# Patient Record
Sex: FEMALE | Race: WHITE | NOT HISPANIC OR LATINO | Employment: OTHER | URBAN - METROPOLITAN AREA
[De-identification: names, ages, dates, MRNs, and addresses within clinical notes are randomized per-mention and may not be internally consistent; named-entity substitution may affect disease eponyms.]

---

## 2023-08-30 DIAGNOSIS — N18.4 CKD (CHRONIC KIDNEY DISEASE) STAGE 4, GFR 15-29 ML/MIN: Primary | ICD-10-CM

## 2023-08-31 RX ORDER — CARVEDILOL 25 MG/1
25 TABLET ORAL 2 TIMES DAILY
COMMUNITY

## 2023-08-31 RX ORDER — VIT C/E/ZN/COPPR/LUTEIN/ZEAXAN 250MG-90MG
CAPSULE ORAL
COMMUNITY

## 2023-08-31 RX ORDER — ROSUVASTATIN CALCIUM 5 MG/1
1 TABLET, COATED ORAL DAILY
COMMUNITY

## 2023-08-31 RX ORDER — LETROZOLE 2.5 MG/1
TABLET, FILM COATED ORAL
COMMUNITY
End: 2023-09-05

## 2023-08-31 RX ORDER — FERROUS SULFATE 325(65) MG
TABLET, DELAYED RELEASE (ENTERIC COATED) ORAL
COMMUNITY
End: 2023-09-05

## 2023-08-31 RX ORDER — LORAZEPAM 1 MG/1
1 TABLET ORAL ONCE
COMMUNITY
Start: 2023-07-25 | End: 2023-09-05

## 2023-08-31 RX ORDER — ANASTROZOLE 1 MG/1
1 TABLET ORAL DAILY
COMMUNITY
Start: 2023-06-20

## 2023-08-31 RX ORDER — LORATADINE 10 MG/1
10 TABLET ORAL
COMMUNITY
End: 2023-09-05

## 2023-08-31 RX ORDER — PROCHLORPERAZINE MALEATE 10 MG
TABLET ORAL
COMMUNITY
End: 2023-09-05

## 2023-08-31 RX ORDER — OMEGA-3-ACID ETHYL ESTERS 1 G/1
2 CAPSULE, LIQUID FILLED ORAL 2 TIMES DAILY
COMMUNITY

## 2023-08-31 RX ORDER — ALLOPURINOL 300 MG/1
300 TABLET ORAL DAILY
COMMUNITY

## 2023-08-31 RX ORDER — FENOFIBRATE 160 MG/1
160 TABLET ORAL DAILY
COMMUNITY
Start: 2023-08-03

## 2023-08-31 RX ORDER — OFLOXACIN 3 MG/ML
5 SOLUTION AURICULAR (OTIC)
COMMUNITY
End: 2023-09-05

## 2023-08-31 RX ORDER — METFORMIN HYDROCHLORIDE 500 MG/1
TABLET ORAL
COMMUNITY
End: 2023-09-05

## 2023-08-31 RX ORDER — MOXIFLOXACIN 5 MG/ML
1 SOLUTION/ DROPS OPHTHALMIC ONCE
COMMUNITY
Start: 2023-06-20

## 2023-08-31 RX ORDER — UMECLIDINIUM BROMIDE AND VILANTEROL TRIFENATATE 62.5; 25 UG/1; UG/1
6.25 POWDER RESPIRATORY (INHALATION) DAILY
COMMUNITY
Start: 2022-03-28

## 2023-08-31 RX ORDER — VALSARTAN AND HYDROCHLOROTHIAZIDE 320; 25 MG/1; MG/1
1 TABLET, FILM COATED ORAL DAILY
COMMUNITY

## 2023-08-31 RX ORDER — ACETAMINOPHEN 500 MG
500 TABLET ORAL EVERY 6 HOURS PRN
COMMUNITY

## 2023-08-31 RX ORDER — HYDRALAZINE HYDROCHLORIDE 50 MG/1
50 TABLET, FILM COATED ORAL 3 TIMES DAILY
COMMUNITY

## 2023-08-31 RX ORDER — MONTELUKAST SODIUM 10 MG/1
1 TABLET ORAL NIGHTLY
COMMUNITY
Start: 2022-10-25

## 2023-09-01 NOTE — PROGRESS NOTES
Pt Name:  Mejia Robins  Pt :  1955  Pt MRN:  [id number]    Date: 2023    Reason for visit:   Mejia Robins is a 67 y.o. c female presenting for initial evaluation of CKD with serum creatinine 1.86, eGFR 27 and Chronic Kidney Disease Stage IV.     Chief Complaint:   The patient denies any complaints today.    HPI:  The patient is being seen today for initial evaluation of CKD and was referred by Dr. Augie Barney.  The patient reports 25 year history of hypertension and has a history of Diabetes Mellitus but has no idea how long she has had it because now she no longer takes medication for her DM.  She has no family history of kidney disease. She is s/p right lumpectomy and lymph node removal on  and required radiation therapy. The patient denies fatigue, weakness, poor appetite, metallic taste, nausea, cramping, chest pain, palpitations, orthopnea, PND, trouble concentrating, decreased urination.  She c/o SOB with exertion, edema.    Home BPs when checked are <150 - 160/80 per patient. The patient is following a low sodium diet. The patient is avoiding NSAIDs. The patient is drinking 64 oz of water daily.       History:   Past Medical History:   Diagnosis Date    Arthritis     Back pain     Breast cancer     Congestive heart failure     Diabetes mellitus     Essential (primary) hypertension     GERD (gastroesophageal reflux disease)     Gout, unspecified     Hypercalcemia     Lung disease     Mixed hyperlipidemia     Neurological disease     Obstructive sleep apnea      Past Surgical History:   Procedure Laterality Date    APPENDECTOMY      choleycystectomy      COLONOSCOPY      HYSTERECTOMY      LUMPECTOMY, BREAST  2020    SKIN TAG REMOVAL      up to 15    SURGICAL REMOVAL OF LYMPH NODE      uterine suspend       Family History   Problem Relation Age of Onset    Heart disease Mother     Dementia Mother     Diabetes Mellitus Mother     Hypertension Mother     Lung cancer Sister         sisters x  2 with cancer; 1  at 50; one with onset at age 65     Social History     Substance and Sexual Activity   Alcohol Use None     Social History     Substance and Sexual Activity   Drug Use Not on file     Social History     Substance and Sexual Activity   Sexual Activity Not on file     has no history on file for sexual activity.  Social History     Tobacco Use   Smoking Status Every Day    Types: Cigarettes, Vaping with nicotine   Smokeless Tobacco Never       Allergies:  Review of patient's allergies indicates:   Allergen Reactions    Amlodipine     Iodinated contrast media Other (See Comments)    Iohexol     Sulfa (sulfonamide antibiotics)     Latex Rash         Current Outpatient Medications:     acetaminophen (TYLENOL) 500 MG tablet, Take 500 mg by mouth every 6 (six) hours as needed., Disp: , Rfl:     allopurinoL (ZYLOPRIM) 300 MG tablet, Take 300 mg by mouth Daily., Disp: , Rfl:     anastrozole (ARIMIDEX) 1 mg Tab, Take 1 mg by mouth once daily., Disp: , Rfl:     carvediloL (COREG) 25 MG tablet, Take 25 mg by mouth 2 (two) times daily., Disp: , Rfl:     cetirizine (ZYRTEC) 10 MG tablet, Take by mouth as needed., Disp: , Rfl:     cycloSPORINE (RESTASIS) 0.05 % ophthalmic emulsion, Place 1 drop into both eyes 2 (two) times daily., Disp: , Rfl:     fenofibrate 160 MG Tab, Take 160 mg by mouth once daily., Disp: , Rfl:     hydrALAZINE (APRESOLINE) 50 MG tablet, Take 50 mg by mouth 3 (three) times daily., Disp: , Rfl:     montelukast (SINGULAIR) 10 mg tablet, Take 1 tablet by mouth every evening., Disp: , Rfl:     moxifloxacin (VIGAMOX) 0.5 % ophthalmic solution, Place 1 drop into the right eye 4 (four) times daily., Disp: , Rfl:     omega-3 acid ethyl esters (LOVAZA) 1 gram capsule, omega-3 acid ethyl esters 1 gram capsule, Disp: , Rfl:     rosuvastatin (CRESTOR) 5 MG tablet, Take 1 tablet by mouth once daily. Takes 40 mg po daily, Disp: , Rfl:     umeclidinium-vilanteroL (ANORO ELLIPTA) 62.5-25 mcg/actuation  "DsDv, Take 6.25 mcg by mouth once daily., Disp: , Rfl:     valsartan-hydrochlorothiazide (DIOVAN-HCT) 320-25 mg per tablet, Take 1 tablet by mouth once daily., Disp: , Rfl:     vit C,I-Up-amfss-lutein-zeaxan (PRESERVISION AREDS-2) 250-90-40-1 mg Cap, Bid, Disp: , Rfl:     dapagliflozin propanediol (FARXIGA) 10 mg tablet, Take 1 tablet (10 mg total) by mouth once daily., Disp: 90 tablet, Rfl: 3    ROS:  Review of Systems   Constitutional:  Negative for activity change and appetite change.   HENT:  Negative for hearing loss.    Eyes:  Negative for visual disturbance.   Respiratory:  Positive for shortness of breath. Negative for wheezing.    Cardiovascular:  Positive for leg swelling. Negative for chest pain.   Gastrointestinal:  Negative for abdominal pain, diarrhea, nausea and vomiting.   Genitourinary:  Negative for decreased urine volume, dysuria, flank pain, frequency and urgency.   Neurological:  Negative for dizziness, weakness and headaches.       Physical Exam:   Vitals:   Vitals:    09/05/23 0855   BP: (!) 121/57   Pulse: (!) 54   SpO2: 96%   Weight: 108 kg (238 lb)   Height: 5' 6" (1.676 m)     Body mass index is 38.41 kg/m².    Physical Exam  Vitals reviewed. Exam conducted with a chaperone present ().   Constitutional:       General: She is not in acute distress.     Appearance: Normal appearance. She is morbidly obese.   HENT:      Head: Normocephalic and atraumatic.      Mouth/Throat:      Mouth: Mucous membranes are moist.   Eyes:      Extraocular Movements: Extraocular movements intact.      Pupils: Pupils are equal, round, and reactive to light.   Cardiovascular:      Rate and Rhythm: Regular rhythm. Bradycardia present.      Heart sounds: Murmur heard.      Systolic murmur is present with a grade of 2/6.   Pulmonary:      Effort: Pulmonary effort is normal.      Breath sounds: No wheezing, rhonchi or rales.   Musculoskeletal:         General: No swelling.      Right lower leg: Edema present. "      Left lower le+ Edema present.   Skin:     General: Skin is warm and dry.   Neurological:      Mental Status: She is alert and oriented to person, place, and time.   Psychiatric:         Mood and Affect: Mood normal.         Behavior: Behavior normal.       Labs/Tests 23:    K 4.8    C02 24  Glu 125  BUN 61  Creat 1.86  CA 10.7  Phos 3.5  Alb 4.7  eGFR 27    PTH 68    Hgb 10.1    Vit D 22.1    Urine Prot/Creat  600 mg       Lab Results   Component Value Date    HGB 11.0 (L) 2022    IRON 90 2022    TIBC 354 2022    FERRITIN 255.5 2022         Lab Results   Component Value Date    EXTIRONSATUR 25 (H) 2022    EXTIRONSATUR 8 (L) 2022    EXTIRONSATUR 10 (L) 2022         Diagnosis:  Plan and Assessment:  1. Type 2 diabetes mellitus with stage 4 chronic kidney disease, without long-term current use of insulin  Assessment & Plan:  Controlled with HgbA1c 7.0% on 8/15/23 - Continue with diet control     Orders:  -     Renal Function Panel; Future; Expected date: 2023  -     Protein/Creatinine Ratio, Urine; Future; Expected date: 2023    2. Hypertension with impaired renal function  Assessment & Plan:  At goal, Monitor BP, 2 Gram NA diet, Continue Coreg 25 mg po BID, Hydralazine 50 mg po TID, Diovan/HCTZ 320/25 mg po daily     Orders:  -     Renal Function Panel; Future; Expected date: 2023    3. Secondary hyperparathyroidism of renal origin  Assessment & Plan:  PTH 68 - No indication for specialized Vitamin D at this time.     Orders:  -     Vitamin D; Future; Expected date: 2023  -     PTH, Intact; Future; Expected date: 2023    4. Hypercalcemia  Assessment & Plan:  Calcium 10.7 / Albumin 4.7; Followed by Hematology/Oncology     Orders:  -     Vitamin D; Future; Expected date: 2023    5. Anemia in stage 4 chronic kidney disease  Assessment & Plan:  Hgb 10.1 - No indication for AGAPITO therapy at this time.     Orders:  -      Hemoglobin; Future; Expected date: 12/05/2023  -     Ferritin; Future; Expected date: 12/05/2023  -     Iron and TIBC; Future; Expected date: 12/05/2023    6. Non-nephrotic range proteinuria  Assessment & Plan:  600 mg - ARB and will add SGLT2     Orders:  -     Protein/Creatinine Ratio, Urine; Future; Expected date: 12/05/2023    7. Vitamin D deficiency, unspecified  Assessment & Plan:  Vitamin D 22.1 - unable to treat due to hypercalcemia with CA 10.7 / albumin 4.7     Orders:  -     Vitamin D; Future; Expected date: 12/05/2023    8. Chronic diastolic congestive heart failure    9. Mixed hyperlipidemia  Assessment & Plan:  Continue - Fenofibrate 160 mg po daily, Lovaza 1 gram po daily       10. Chronic kidney disease, stage IV (severe)  Assessment & Plan:  Serum Creatinine 1.86 / eGFR 27; Avoid NSAIDS, Assure 72 oz of water daily, Meds per med list above.  On ARB and will add SGLT2 if her insurance will cover it.  Will refer to CKD Smart [facility] for further education.     Will obtain Renal US - when [facility] system is intact and allows scheduling     Have discussed futility of renal biopsy and she defers at this time.     Orders:  -     US Kidney; Future; Expected date: 10/05/2023  -     Ambulatory referral/consult to Kidney Smart; Future; Expected date: 09/12/2023  -     Hemoglobin; Future; Expected date: 12/05/2023  -     Vitamin D; Future; Expected date: 12/05/2023  -     Renal Function Panel; Future; Expected date: 12/05/2023  -     PTH, Intact; Future; Expected date: 12/05/2023  -     Ferritin; Future; Expected date: 12/05/2023  -     Iron and TIBC; Future; Expected date: 12/05/2023  -     Protein/Creatinine Ratio, Urine; Future; Expected date: 12/05/2023    Other orders  -     dapagliflozin propanediol (FARXIGA) 10 mg tablet; Take 1 tablet (10 mg total) by mouth once daily.  Dispense: 90 tablet; Refill: 3           My reconciliation of medication should not condone or support use of medications that have been  previously prescribed for patients by other providers.  I am only documenting the current medications patients is taking and may change doses, add or discontinue medications based on information provided by the patient.     The patient has been provided with their current level of kidney function including eGFR and creatinine.    We discussed the potential for common complications of CKD including anemia, electrolyte abnormalities, abnormal fluid balance, mineral bone disease and malnutrition.    We discussed strategies to slow the progression of their kidney disease including:  Avoid nephrotoxic agents. Avoid over-the-counter and prescription NSAIDs (Ibuprofren, Motrin, Naproxyn, Aleve, Mobic, Celebrex, Toradol, Advil). All of these can worsen kidney function, elevate BP, cause fluid retention/swelling and elevate potassium. Avoid iodine contrast agents and gadolinium, which can worsen kidney function and/or cause kidney failure. Avoid phosphosoda bowel preps which can worsen kidney function.  Work to improve modifiable risk factors. Aim for good control of blood glucose without episodes of hypoglycemia. Notify the provider managing your diabetes if your blood glucose < 60. Aim for good blood pressure control without episodes of hypotension. Call the office if your systolic blood pressure is consistently < 110. Aim for good control of your cholesterol.  AIC goal <7.0  BP goal <130/80        Keeping these in goal range will help prevent progression of cardiovascular disease            and chronic kidney disease.    We discussed dietary modifications:  Low sodium diet: 2 gm/d or less  Limit/avoid high potassium foods  Avoid potassium containing salt substitutes  Limit/avoid high phosphorus foods  Limit daily protein intake to 0.8-1 gm/kg of your ideal body weight.    We discussed lifestyle modifications:  Make sure you are drinking plenty of fluids--64 ounces (1/2 gallon) daily  Exercise at least 30 minutes 5 x per  week (total 150 minutes per week), example brisk walking  Achieve and maintain a healthy weight (BMI 20-25)  Limit alcohol consumption to <2 drinks per day  Stop smoking  Make sure you stay current on important vaccines-- pneumonia vaccines (Pneumovax and Prevnar), flu vaccine, Hepatitis B (especially patients nearing renal replacement therapy and planning hemodialysis) and Covid-19 vaccine.     Recommendations:  Monitor your BP at home daily and record.  Bring readings to your next appt.  Call the office if your BP is persistently >130/80.  Seek urgent medical attention with signs and symptoms of uremia - extreme weakness, fatigue, confusion, anorexia, metallic taste in mouth, hiccoughs, cramping, itching, chest pain, swelling, or trouble sleeping.    Follow Up:   Follow up in about 3 months (around 12/5/2023).

## 2023-09-01 NOTE — ASSESSMENT & PLAN NOTE
At goal, Monitor BP, 2 Gram NA diet, Continue Coreg 25 mg po BID, Hydralazine 50 mg po TID, Diovan/HCTZ 320/25 mg po daily

## 2023-09-01 NOTE — ASSESSMENT & PLAN NOTE
Serum Creatinine 1.86 / eGFR 27; Avoid NSAIDS, Assure 72 oz of water daily, Meds per med list above.  On ARB and will add SGLT2 if her insurance will cover it.  Will refer to CKD Smart [facility] for further education.     Will obtain Renal US - when [facility] system is intact and allows scheduling     Have discussed futility of renal biopsy and she defers at this time.

## 2023-09-05 ENCOUNTER — OFFICE VISIT (OUTPATIENT)
Dept: NEPHROLOGY | Facility: CLINIC | Age: 68
End: 2023-09-05
Payer: MEDICARE

## 2023-09-05 VITALS
OXYGEN SATURATION: 96 % | DIASTOLIC BLOOD PRESSURE: 57 MMHG | WEIGHT: 238 LBS | HEART RATE: 54 BPM | BODY MASS INDEX: 38.25 KG/M2 | SYSTOLIC BLOOD PRESSURE: 121 MMHG | HEIGHT: 66 IN

## 2023-09-05 DIAGNOSIS — N18.4 TYPE 2 DIABETES MELLITUS WITH STAGE 4 CHRONIC KIDNEY DISEASE, WITHOUT LONG-TERM CURRENT USE OF INSULIN: ICD-10-CM

## 2023-09-05 DIAGNOSIS — E83.52 HYPERCALCEMIA: ICD-10-CM

## 2023-09-05 DIAGNOSIS — E55.9 VITAMIN D DEFICIENCY, UNSPECIFIED: ICD-10-CM

## 2023-09-05 DIAGNOSIS — D63.1 ANEMIA IN STAGE 4 CHRONIC KIDNEY DISEASE: ICD-10-CM

## 2023-09-05 DIAGNOSIS — N25.81 SECONDARY HYPERPARATHYROIDISM OF RENAL ORIGIN: ICD-10-CM

## 2023-09-05 DIAGNOSIS — E11.22 TYPE 2 DIABETES MELLITUS WITH STAGE 4 CHRONIC KIDNEY DISEASE, WITHOUT LONG-TERM CURRENT USE OF INSULIN: ICD-10-CM

## 2023-09-05 DIAGNOSIS — N18.4 ANEMIA IN STAGE 4 CHRONIC KIDNEY DISEASE: ICD-10-CM

## 2023-09-05 DIAGNOSIS — R80.9 NON-NEPHROTIC RANGE PROTEINURIA: ICD-10-CM

## 2023-09-05 DIAGNOSIS — N18.4 CHRONIC KIDNEY DISEASE, STAGE IV (SEVERE): ICD-10-CM

## 2023-09-05 DIAGNOSIS — I12.9 HYPERTENSION WITH IMPAIRED RENAL FUNCTION: ICD-10-CM

## 2023-09-05 DIAGNOSIS — E78.2 MIXED HYPERLIPIDEMIA: ICD-10-CM

## 2023-09-05 DIAGNOSIS — I50.32 CHRONIC DIASTOLIC CONGESTIVE HEART FAILURE: ICD-10-CM

## 2023-09-05 PROCEDURE — 99204 OFFICE O/P NEW MOD 45 MIN: CPT | Mod: S$GLB,,, | Performed by: NURSE PRACTITIONER

## 2023-09-05 PROCEDURE — 99204 PR OFFICE/OUTPT VISIT, NEW, LEVL IV, 45-59 MIN: ICD-10-PCS | Mod: S$GLB,,, | Performed by: NURSE PRACTITIONER

## 2023-09-05 RX ORDER — CETIRIZINE HYDROCHLORIDE 10 MG/1
TABLET ORAL
COMMUNITY

## 2023-09-05 RX ORDER — CYCLOSPORINE 0.5 MG/ML
1 EMULSION OPHTHALMIC 2 TIMES DAILY
COMMUNITY

## 2023-09-05 RX ORDER — DAPAGLIFLOZIN 10 MG/1
10 TABLET, FILM COATED ORAL DAILY
Qty: 90 TABLET | Refills: 3 | Status: SHIPPED | OUTPATIENT
Start: 2023-09-05

## 2023-09-12 ENCOUNTER — TELEPHONE (OUTPATIENT)
Dept: NEPHROLOGY | Facility: CLINIC | Age: 68
End: 2023-09-12
Payer: MEDICARE

## 2023-09-19 ENCOUNTER — DOCUMENTATION ONLY (OUTPATIENT)
Dept: NEPHROLOGY | Facility: CLINIC | Age: 68
End: 2023-09-19
Payer: MEDICARE

## 2023-09-19 NOTE — PROGRESS NOTES
Pt called to go over bp log from 9/6-9/18/23 with bp 124//71. Pt then said her sbp have been running 121-140 since starting the farxiga. The pt did not want to change her bp med at this time, so no changes were done. She was instructed to keep a bp log of every other day and report any significant changes. Pt acknowledged understanding.

## 2023-10-02 ENCOUNTER — TELEPHONE (OUTPATIENT)
Dept: NEPHROLOGY | Facility: CLINIC | Age: 68
End: 2023-10-02
Payer: MEDICARE

## 2023-10-02 NOTE — TELEPHONE ENCOUNTER
Patient called-states she is having dry mouth, muscle cramps in back and leg, no appetite including drinking anything since starting the farxiga. Also, she says her BP is dropping to low.     9/25 117/55  9/26  121/57  9/27 115/48  9/28  127/49  9/29  109/48  9/30  101/40  10/2  106/42    Patient asked if she could cut the Farxiga in half ?   Per Viviana Nowak no changes were made when patient turned in her BP readings.  She would like to know what can she do?     Please advise

## 2023-10-03 NOTE — TELEPHONE ENCOUNTER
"I spoke to patient, She would like to know about Jardiance ? If this is something she could take instead? I tried to tell her there was nothing to put in place for Farxiga- it was a stand alone drug but she did not understand. She did not understand why she was having all these symptoms. I told her to contact her PCP and explain those symptoms to her PCP because those symptoms (back pain and back/leg cramps) would not be coming from [facility]. She said, " she drinks enough water that her dry mouth isn't because of that". All of her symptoms started when she started this medication. Pt instructed to stop medication.  She proceeds to say she felt like she was being put on the back burner that she needed to find a new Nephrology provider. I told her I would ask about Jardiance and get back to her. She was very upset.   Please advise "

## 2023-12-12 ENCOUNTER — OFFICE VISIT (OUTPATIENT)
Dept: NEPHROLOGY | Facility: CLINIC | Age: 68
End: 2023-12-12
Payer: MEDICARE

## 2023-12-12 VITALS
HEART RATE: 80 BPM | WEIGHT: 220.81 LBS | SYSTOLIC BLOOD PRESSURE: 158 MMHG | OXYGEN SATURATION: 97 % | BODY MASS INDEX: 35.49 KG/M2 | DIASTOLIC BLOOD PRESSURE: 73 MMHG | HEIGHT: 66 IN

## 2023-12-12 DIAGNOSIS — D50.8 OTHER IRON DEFICIENCY ANEMIA: ICD-10-CM

## 2023-12-12 DIAGNOSIS — E66.01 CLASS 2 SEVERE OBESITY DUE TO EXCESS CALORIES WITH SERIOUS COMORBIDITY AND BODY MASS INDEX (BMI) OF 35.0 TO 35.9 IN ADULT: ICD-10-CM

## 2023-12-12 DIAGNOSIS — E11.22 TYPE 2 DIABETES MELLITUS WITH STAGE 4 CHRONIC KIDNEY DISEASE, WITHOUT LONG-TERM CURRENT USE OF INSULIN: Primary | ICD-10-CM

## 2023-12-12 DIAGNOSIS — I12.9 HYPERTENSION WITH IMPAIRED RENAL FUNCTION: ICD-10-CM

## 2023-12-12 DIAGNOSIS — N18.4 TYPE 2 DIABETES MELLITUS WITH STAGE 4 CHRONIC KIDNEY DISEASE, WITHOUT LONG-TERM CURRENT USE OF INSULIN: Primary | ICD-10-CM

## 2023-12-12 DIAGNOSIS — E83.52 HYPERCALCEMIA: ICD-10-CM

## 2023-12-12 DIAGNOSIS — N18.4 CHRONIC KIDNEY DISEASE, STAGE IV (SEVERE): ICD-10-CM

## 2023-12-12 DIAGNOSIS — E78.2 MIXED HYPERLIPIDEMIA: ICD-10-CM

## 2023-12-12 DIAGNOSIS — E55.9 VITAMIN D DEFICIENCY, UNSPECIFIED: ICD-10-CM

## 2023-12-12 DIAGNOSIS — R80.9 NON-NEPHROTIC RANGE PROTEINURIA: ICD-10-CM

## 2023-12-12 PROBLEM — D50.9 IRON DEFICIENCY ANEMIA, UNSPECIFIED: Status: ACTIVE | Noted: 2023-12-12

## 2023-12-12 PROCEDURE — 99214 PR OFFICE/OUTPT VISIT, EST, LEVL IV, 30-39 MIN: ICD-10-PCS | Mod: S$GLB,,, | Performed by: NURSE PRACTITIONER

## 2023-12-12 PROCEDURE — 99214 OFFICE O/P EST MOD 30 MIN: CPT | Mod: S$GLB,,, | Performed by: NURSE PRACTITIONER

## 2023-12-12 NOTE — PROGRESS NOTES
Pt Name:  Mejia Robins  Pt :  1955  Pt MRN:  [id number]    Date: 2023    Reason for visit:   Mejia Robins is a 67 y.o. c female presenting for CKD follow up with serum creatinine 1.96, eGFR 27 and Chronic Kidney Disease Stage 27.     Chief Complaint:   The patient denies any complaints today.    HPI:  The patient is being seen today for follow up CKD and the status of her kidney function. Since the last visit, patient reports no health changes. She is down 18 lbs since her last OV and reports she is not taking her Coreg, Hydralazine nor her Valsartan/HCTZ routinely.  She reports her Bps are running 120/ 60 - 70.  The patient denies fatigue, weakness, poor appetite, metallic taste, nausea, cramping, chest pain, palpitations, SOB, orthopnea, PND, trouble concentrating, decreased urination.  She c/o edema.     Home BPs when checked are <130/80 per patient. she is not taking her Coreg, Hydralazine nor her Valsartan/HCTZ routinely.  She reports her Bps are running 120/ 60 - 70. The patient is following a low sodium diet, she reports cutting out all salt.  The patient is avoiding NSAIDs. The patient is drinking 72 oz of water daily.     Since last visit on 23 patient reports no changes in medical history.      History:   Past Medical History:   Diagnosis Date    Anemia in stage 4 chronic kidney disease     Arthritis     Back pain     Breast cancer     Congestive heart failure     Congestive heart failure     Diabetes mellitus     Essential (primary) hypertension     GERD (gastroesophageal reflux disease)     Gout, unspecified     Hypercalcemia     Lung disease     Mixed hyperlipidemia     Neurological disease     Obstructive sleep apnea     Secondary hyperparathyroidism of renal origin      Past Surgical History:   Procedure Laterality Date    APPENDECTOMY      choleycystectomy      COLONOSCOPY      HYSTERECTOMY      LUMPECTOMY, BREAST  2020    SKIN TAG REMOVAL      up to 15    SURGICAL REMOVAL OF  LYMPH NODE      uterine suspend       Family History   Problem Relation Age of Onset    Heart disease Mother     Dementia Mother     Diabetes Mellitus Mother     Hypertension Mother     Lung cancer Sister         sisters x 2 with cancer; 1  at 50; one with onset at age 65     Social History     Substance and Sexual Activity   Alcohol Use None     Social History     Substance and Sexual Activity   Drug Use Not on file     Social History     Substance and Sexual Activity   Sexual Activity Not on file     has no history on file for sexual activity.  Social History     Tobacco Use   Smoking Status Every Day    Types: Cigarettes, Vaping with nicotine   Smokeless Tobacco Never       Allergies:  Review of patient's allergies indicates:   Allergen Reactions    Amlodipine     Iodinated contrast media Other (See Comments)    Iohexol     Sulfa (sulfonamide antibiotics)     Latex Rash         Current Outpatient Medications:     acetaminophen (TYLENOL) 500 MG tablet, Take 500 mg by mouth every 6 (six) hours as needed., Disp: , Rfl:     allopurinoL (ZYLOPRIM) 300 MG tablet, Take 300 mg by mouth Daily., Disp: , Rfl:     anastrozole (ARIMIDEX) 1 mg Tab, Take 1 mg by mouth once daily., Disp: , Rfl:     carvediloL (COREG) 25 MG tablet, Take 25 mg by mouth 2 (two) times daily. Has not been taking routinely, Disp: , Rfl:     cetirizine (ZYRTEC) 10 MG tablet, Take by mouth as needed., Disp: , Rfl:     cycloSPORINE (RESTASIS) 0.05 % ophthalmic emulsion, Place 1 drop into both eyes 2 (two) times daily., Disp: , Rfl:     dapagliflozin propanediol (FARXIGA) 10 mg tablet, Take 1 tablet (10 mg total) by mouth once daily., Disp: 90 tablet, Rfl: 3    fenofibrate 160 MG Tab, Take 160 mg by mouth once daily., Disp: , Rfl:     hydrALAZINE (APRESOLINE) 50 MG tablet, Take 50 mg by mouth 3 (three) times daily. Has not been taking routinely, Disp: , Rfl:     montelukast (SINGULAIR) 10 mg tablet, Take 1 tablet by mouth every evening., Disp: , Rfl:  "    moxifloxacin (VIGAMOX) 0.5 % ophthalmic solution, Place 1 drop into the right eye once. Every 6-8 weeks 4drops a day for four days, Disp: , Rfl:     omega-3 acid ethyl esters (LOVAZA) 1 gram capsule, Take 2 g by mouth 2 (two) times daily., Disp: , Rfl:     rosuvastatin (CRESTOR) 5 MG tablet, Take 1 tablet by mouth once daily. Takes 40 mg po daily, Disp: , Rfl:     umeclidinium-vilanteroL (ANORO ELLIPTA) 62.5-25 mcg/actuation DsDv, Take 6.25 mcg by mouth once daily., Disp: , Rfl:     valsartan-hydrochlorothiazide (DIOVAN-HCT) 320-25 mg per tablet, Take 1 tablet by mouth once daily. Has not been taking routinely, Disp: , Rfl:     vit C,A-Vh-ajokm-lutein-zeaxan (PRESERVISION AREDS-2) 250-90-40-1 mg Cap, Bid, Disp: , Rfl:     ROS:  Review of Systems   Constitutional:  Negative for activity change and appetite change.   HENT:  Negative for hearing loss.    Eyes:  Negative for visual disturbance.   Respiratory:  Negative for shortness of breath and wheezing.    Cardiovascular:  Positive for leg swelling. Negative for chest pain.   Gastrointestinal:  Negative for abdominal pain, diarrhea, nausea and vomiting.   Genitourinary:  Negative for decreased urine volume, dysuria, flank pain, frequency and urgency.   Neurological:  Negative for dizziness, weakness and headaches.       Physical Exam:   Vitals:   Vitals:    12/12/23 1423 12/12/23 1427   BP: (!) 167/71 (!) 158/73   Pulse: 80    SpO2: 97%    Weight: 100.2 kg (220 lb 12.8 oz)    Height: 5' 6" (1.676 m)      Body mass index is 35.64 kg/m².    Physical Exam  Vitals reviewed. Exam conducted with a chaperone present ().   Constitutional:       General: She is not in acute distress.     Appearance: Normal appearance.   HENT:      Head: Normocephalic and atraumatic.      Mouth/Throat:      Mouth: Mucous membranes are moist.   Eyes:      Extraocular Movements: Extraocular movements intact.      Pupils: Pupils are equal, round, and reactive to light. "   Cardiovascular:      Rate and Rhythm: Normal rate and regular rhythm.      Heart sounds: Murmur heard.      Systolic murmur is present with a grade of 2/6.   Pulmonary:      Effort: Pulmonary effort is normal.      Breath sounds: No wheezing, rhonchi or rales.   Musculoskeletal:         General: No swelling.      Right lower leg: Edema present.      Left lower leg: Edema present.   Skin:     General: Skin is warm and dry.   Neurological:      Mental Status: She is alert and oriented to person, place, and time.   Psychiatric:         Mood and Affect: Mood normal.         Behavior: Behavior normal.           Labs/Tests 12/5/23:    Order: [id number]   Ref Range & Units 7 d ago   Sodium 136 - 147 mmol/L 135 Low    Potassium 3.5 - 5.1 mmol/L 4.6   Chloride 98 - 107 mmol/L 103   CO2 20 - 31 mmol/L 23   Glucose 70 - 99 mg/dL 122 High    BUN 9 - 23 mg/dL 54 High    Creatinine 0.55 - 1.02 mg/dL 1.96 High    Calcium 8.3 - 10.6 mg/dL 11.0 High    Phosphorus Level 2.4 - 5.1 mg/dL 3.4   Albumin Level 3.2 - 4.8 g/dL 4.6   Anion Gap 5.0 - 15.0 mmol/L 9.1   eGFR CKD-EPI >90 ml/min/1.73m2 27 Low      Lab Results   Component Value Date    PTH 60 12/05/2023    IRON 55 12/05/2023    TIBC 404 12/05/2023    FERRITIN 529.4 (H) 12/05/2023    [id number] 25.8 (L) 12/05/2023     HEMOGLOBIN  Order: [id number]   Ref Range & Units 7 d ago   Hemoglobin 11.3 - 15.4 g/dL 12.0     Order: [id number]  Status: Final result       Next appt: Today at 02:30 PM in Nephrology (JOSH[facility])               Component Ref Range & Units 7 d ago  (12/5/23) 4 mo ago  (7/25/23) 8 mo ago  (3/28/23) 11 mo ago  (12/28/22) 1 yr ago  (11/1/22) 1 yr ago  (8/1/22) 2 yr ago  (8/9/21)   Iron 50 - 175 ug/dL 55 80 67 90 31 Low  45 Low  38 Low    TIBC 250 - 425 ug/dL 404 391 486 High  354 407 447 High  409   Iron Saturation 15 - 20 % 14 Low  20 14 Low  25 High  8 Low  10 Low            Order: [id number]  Status: Final result       Next appt: Today at 02:30 PM in  Nephrology (ANABELL Rice, JOSHUA)             Component Ref Range & Units 7 d ago 4 mo ago 8 mo ago 11 mo ago 2 yr ago   Ferritin 7.3 - 270.7 ng/mL 529.4 High  542.8 High  108.4 255.5 14.7          Lab Results   Component Value Date    UPROTCREA 0.6 (H) 12/05/2023    UPROTCREA 0.6 (H) 08/31/2023    EXTIRONSATUR 14 (L) 12/05/2023    EXTIRONSATUR 20 07/25/2023    EXTIRONSATUR 14 (L) 03/28/2023      Kidney  Order: [id number]  Impression    IMPRESSION:epic:EPIC?ACTIVITY2&MR_PROBLEM_LIST_REDIRECTOR&PATIENTID%3b++[id number]&&&&&&False&Default&False  1. 2 hyperechoic nodules arising from the left renal cortex felt  represent angiomyolipomas.  Otherwise unremarkable study.  No  hydronephrosis  Narrative    EXAMINATION:  US KIDNEYS (RENAL),  9/7/2023    CLINICAL HISTORY:  66 y/o  Female,  Chronic kidney failure, stage 4 (severe)    COMPARISON:  No previous comparable studies are available for review.    TECHNIQUE:  Complete real-time scanning with image documentation, with Doppler  color-flow evaluation.    FINDINGS:      Kidneys are normal in size, position and echotexture.  1.0 hyperechoic  nodule arising from the left kidney consistent with angiomyolipoma.  There is no hydronephrosis, suspicious renal mass, or perinephric fluid.  The renal sinus color Doppler evaluation is within normal limits.    The urinary bladder is unremarkable .    No significant free fluid is demonstrated within the upper abdomen.  There is no pelvic free fluid.    Diagnosis:  Plan and Assessment:  1. Type 2 diabetes mellitus with stage 4 chronic kidney disease, without long-term current use of insulin  Assessment & Plan:  Controlled with HgbA1c 7.0% on 8/15/23 - Continue Farxiga 10 mg po daily     Orders:  -     Protein/Creatinine Ratio, Urine; Future; Expected date: 03/12/2024  -     Renal Function Panel; Future; Expected date: 03/12/2024    2. Hypertension with impaired renal function  Assessment & Plan:  Near goal,  Monitor BP, 2 Gram NA diet, Continue Coreg 25 mg po BID, Hydralazine 50 mg po TID, Diovan/HCTZ 320/25 mg po daily     She is only taking all of her home BP meds (as needed when her SBP is 140).  She has lost 18 lbs and reports she has not had to take her BP meds due to this.     Orders:  -     Renal Function Panel; Future; Expected date: 03/12/2024    3. Chronic kidney disease, stage IV (severe)  Assessment & Plan:  Serum Creatinine 1.96 / eGFR 27; (1.86 / eGFR 27 on 8/31/23) - without Anemia -  without SHPT -  Avoid NSAIDS, Assure 72 oz of water daily, Meds per med list above.  On ARB and will add SGLT2 if her insurance will cover it.  Will refer to CKD Smart Class for further education.     Will [facility] and [facility]     Have discussed futility of renal biopsy and she defers at this time.     Orders:  -     Hemoglobin; Future; Expected date: 03/12/2024  -     Protein/Creatinine Ratio, Urine; Future; Expected date: 03/12/2024  -     PTH, Intact; Future; Expected date: 03/12/2024  -     Renal Function Panel; Future; Expected date: 03/12/2024  -     Vitamin D; Future; Expected date: 03/12/2024    4. Hypercalcemia  Assessment & Plan:  Calcium 11 / Albumin 4.6; Followed by Hematology/Oncology     Orders:  -     PTH, Intact; Future; Expected date: 03/12/2024    5. Non-nephrotic range proteinuria  Assessment & Plan:  600 mg - stable - 600 mg - ARB and will add SGLT2     She has not been taking her ARB consistently.     Orders:  -     Protein/Creatinine Ratio, Urine; Future; Expected date: 03/12/2024    6. Vitamin D deficiency, unspecified  Assessment & Plan:  Vitamin D 25.8 - up from 22.1 - unable to treat due to hypercalcemia with CA 11 / albumin 4.6     Orders:  -     Vitamin D; Future; Expected date: 03/12/2024    7. Other iron deficiency anemia  Assessment & Plan:  Iron 55  TIBC 404  Sat 14%  Ferritin 529.4     She is followed by Hematology/Oncology       8. Mixed  hyperlipidemia  Assessment & Plan:  Continue - Fenofibrate 160 mg po daily, Lovaza 1 gram po daily, Crestor 5 mg po daily       9. Class 2 severe obesity due to excess calories with serious comorbidity and body mass index (BMI) of 35.0 to 35.9 in adult  Assessment & Plan:  BMI 35  -  She is down 18 lbs since her last OV.  She exercises as her health allows              My reconciliation of medication should not condone or support use of medications that have been previously prescribed for patients by other providers.  I am only documenting the current medications patients is taking and may change doses, add or discontinue medications based on information provided by the patient.     The patient has been provided with their current level of kidney function including eGFR and creatinine.    We discussed the potential for common complications of CKD including anemia, electrolyte abnormalities, abnormal fluid balance, mineral bone disease and malnutrition.    We discussed strategies to slow the progression of their kidney disease including:  Avoid nephrotoxic agents. Avoid over-the-counter and prescription NSAIDs (Ibuprofren, Motrin, Naproxyn, Aleve, Mobic, Celebrex, Toradol, Advil). All of these can worsen kidney function, elevate BP, cause fluid retention/swelling and elevate potassium. Avoid iodine contrast agents and gadolinium, which can worsen kidney function and/or cause kidney failure. Avoid phosphosoda bowel preps which can worsen kidney function.  Work to improve modifiable risk factors. Aim for good control of blood glucose without episodes of hypoglycemia. Notify the provider managing your diabetes if your blood glucose < 60. Aim for good blood pressure control without episodes of hypotension. Call the office if your systolic blood pressure is consistently < 110. Aim for good control of your cholesterol.  AIC goal <7.0  BP goal <130/80        Keeping these in goal range will help prevent progression of  cardiovascular disease            and chronic kidney disease.    We discussed dietary modifications:  Low sodium diet: 2 gm/d or less  Limit/avoid high potassium foods  Avoid potassium containing salt substitutes  Limit/avoid high phosphorus foods  Limit daily protein intake to 0.8-1 gm/kg of your ideal body weight.    We discussed lifestyle modifications:  Make sure you are drinking plenty of fluids--64 ounces (1/2 gallon) daily  Exercise at least 30 minutes 5 x per week (total 150 minutes per week), example brisk walking  Achieve and maintain a healthy weight (BMI 20-25)  Limit alcohol consumption to <2 drinks per day  Stop smoking  Make sure you stay current on important vaccines-- pneumonia vaccines (Pneumovax and Prevnar), flu vaccine, Hepatitis B (especially patients nearing renal replacement therapy and planning hemodialysis) and Covid-19 vaccine.     Recommendations:  Monitor your BP at home daily and record.  Bring readings to your next appt.  Call the office if your BP is persistently >130/80.  Seek urgent medical attention with signs and symptoms of uremia - extreme weakness, fatigue, confusion, anorexia, metallic taste in mouth, hiccoughs, cramping, itching, chest pain, swelling, or trouble sleeping.    Follow Up:   Follow up in about 3 months (around 3/12/2024).

## 2023-12-12 NOTE — ASSESSMENT & PLAN NOTE
Near goal, Monitor BP, 2 Gram NA diet, Continue Coreg 25 mg po BID, Hydralazine 50 mg po TID, Diovan/HCTZ 320/25 mg po daily     She is only taking all of her home BP meds (as needed when her SBP is 140).  She has lost 18 lbs and reports she has not had to take her BP meds due to this.

## 2023-12-12 NOTE — ASSESSMENT & PLAN NOTE
600 mg - stable - 600 mg - ARB and will add SGLT2     She has not been taking her ARB consistently.

## 2023-12-12 NOTE — ASSESSMENT & PLAN NOTE
Serum Creatinine 1.96 / eGFR 27; (1.86 / eGFR 27 on 8/31/23) - without Anemia -  without SHPT -  Avoid NSAIDS, Assure 72 oz of water daily, Meds per med list above.  On ARB and will add SGLT2 if her insurance will cover it.  Will refer to CKD Smart Class for further education.     Will obtain Renal US - when [facility] system is intact and allows scheduling     Have discussed futility of renal biopsy and she defers at this time.